# Patient Record
Sex: FEMALE | URBAN - METROPOLITAN AREA
[De-identification: names, ages, dates, MRNs, and addresses within clinical notes are randomized per-mention and may not be internally consistent; named-entity substitution may affect disease eponyms.]

---

## 2018-07-30 ENCOUNTER — APPOINTMENT (RX ONLY)
Dept: URBAN - METROPOLITAN AREA CLINIC 87 | Facility: CLINIC | Age: 38
Setting detail: DERMATOLOGY
End: 2018-07-30

## 2018-07-30 DIAGNOSIS — L81.0 POSTINFLAMMATORY HYPERPIGMENTATION: ICD-10-CM

## 2018-07-30 PROBLEM — L81.9 DISORDER OF PIGMENTATION, UNSPECIFIED: Status: ACTIVE | Noted: 2018-07-30

## 2018-07-30 PROCEDURE — ? COUNSELING

## 2018-07-30 PROCEDURE — 99202 OFFICE O/P NEW SF 15 MIN: CPT

## 2018-07-30 PROCEDURE — ? TREATMENT REGIMEN

## 2018-07-30 PROCEDURE — ? ORDER TESTS

## 2018-07-30 ASSESSMENT — LOCATION DETAILED DESCRIPTION DERM
LOCATION DETAILED: RIGHT AXILLARY VAULT
LOCATION DETAILED: LEFT AXILLARY VAULT

## 2018-07-30 ASSESSMENT — LOCATION SIMPLE DESCRIPTION DERM
LOCATION SIMPLE: LEFT AXILLARY VAULT
LOCATION SIMPLE: RIGHT AXILLARY VAULT

## 2018-07-30 ASSESSMENT — LOCATION ZONE DERM: LOCATION ZONE: AXILLAE

## 2018-07-30 NOTE — PROCEDURE: TREATMENT REGIMEN
Otc Regimen: Soft and Dry - Aluminum free
Detail Level: Zone
Plan: Discoloration can run in families and is more common in patient with darker skin tones. Recent blood glucose WNL per patient. Patient currently on Hydroquinone 10%. Await bacterial culture, if WNL will need to discuss combination therapy. Can also consider hair laser